# Patient Record
Sex: MALE | Race: WHITE
[De-identification: names, ages, dates, MRNs, and addresses within clinical notes are randomized per-mention and may not be internally consistent; named-entity substitution may affect disease eponyms.]

---

## 2019-04-08 ENCOUNTER — HOSPITAL ENCOUNTER (INPATIENT)
Dept: HOSPITAL 35 - ER | Age: 64
LOS: 2 days | Discharge: HOME | DRG: 246 | End: 2019-04-10
Attending: HOSPITALIST | Admitting: HOSPITALIST
Payer: COMMERCIAL

## 2019-04-08 VITALS — SYSTOLIC BLOOD PRESSURE: 109 MMHG | DIASTOLIC BLOOD PRESSURE: 64 MMHG

## 2019-04-08 VITALS — HEIGHT: 72 IN | BODY MASS INDEX: 29.39 KG/M2 | WEIGHT: 217 LBS

## 2019-04-08 VITALS — SYSTOLIC BLOOD PRESSURE: 124 MMHG | DIASTOLIC BLOOD PRESSURE: 70 MMHG

## 2019-04-08 VITALS — SYSTOLIC BLOOD PRESSURE: 123 MMHG | DIASTOLIC BLOOD PRESSURE: 77 MMHG

## 2019-04-08 VITALS — DIASTOLIC BLOOD PRESSURE: 69 MMHG | SYSTOLIC BLOOD PRESSURE: 135 MMHG

## 2019-04-08 VITALS — DIASTOLIC BLOOD PRESSURE: 66 MMHG | SYSTOLIC BLOOD PRESSURE: 131 MMHG

## 2019-04-08 VITALS — DIASTOLIC BLOOD PRESSURE: 67 MMHG | SYSTOLIC BLOOD PRESSURE: 113 MMHG

## 2019-04-08 VITALS — DIASTOLIC BLOOD PRESSURE: 64 MMHG | SYSTOLIC BLOOD PRESSURE: 109 MMHG

## 2019-04-08 VITALS — DIASTOLIC BLOOD PRESSURE: 84 MMHG | SYSTOLIC BLOOD PRESSURE: 168 MMHG

## 2019-04-08 DIAGNOSIS — Z82.49: ICD-10-CM

## 2019-04-08 DIAGNOSIS — Z79.899: ICD-10-CM

## 2019-04-08 DIAGNOSIS — Z87.891: ICD-10-CM

## 2019-04-08 DIAGNOSIS — F32.9: ICD-10-CM

## 2019-04-08 DIAGNOSIS — I21.4: Primary | ICD-10-CM

## 2019-04-08 DIAGNOSIS — E78.5: ICD-10-CM

## 2019-04-08 DIAGNOSIS — I50.33: ICD-10-CM

## 2019-04-08 DIAGNOSIS — F41.9: ICD-10-CM

## 2019-04-08 LAB
ALBUMIN SERPL-MCNC: 3.4 G/DL (ref 3.4–5)
ALT SERPL-CCNC: 31 U/L (ref 30–65)
ANION GAP SERPL CALC-SCNC: 14 MMOL/L (ref 7–16)
AST SERPL-CCNC: 19 U/L (ref 15–37)
BASOPHILS NFR BLD AUTO: 1.4 % (ref 0–2)
BILIRUB SERPL-MCNC: 0.4 MG/DL
BUN SERPL-MCNC: 34 MG/DL (ref 7–18)
CALCIUM SERPL-MCNC: 8.8 MG/DL (ref 8.5–10.1)
CHLORIDE SERPL-SCNC: 103 MMOL/L (ref 98–107)
CHOLEST SERPL-MCNC: 200 MG/DL (ref ?–200)
CO2 SERPL-SCNC: 22 MMOL/L (ref 21–32)
CREAT SERPL-MCNC: 1.4 MG/DL (ref 0.7–1.3)
EOSINOPHIL NFR BLD: 1.9 % (ref 0–3)
ERYTHROCYTE [DISTWIDTH] IN BLOOD BY AUTOMATED COUNT: 12.9 % (ref 10.5–14.5)
EST. AVERAGE GLUCOSE BLD GHB EST-MCNC: 111 MG/DL
GLUCOSE SERPL-MCNC: 125 MG/DL (ref 74–106)
GLYCOHEMOGLOBIN (HGB A1C): 5.5 % (ref 4.8–5.6)
GRANULOCYTES NFR BLD MANUAL: 53.7 % (ref 36–66)
HCT VFR BLD CALC: 40.6 % (ref 42–52)
HDLC SERPL-MCNC: 46 MG/DL (ref 40–?)
HGB BLD-MCNC: 14.2 GM/DL (ref 14–18)
LDLC SERPL-MCNC: 134 MG/DL (ref ?–100)
LYMPHOCYTES NFR BLD AUTO: 36.4 % (ref 24–44)
MCH RBC QN AUTO: 31 PG (ref 26–34)
MCHC RBC AUTO-ENTMCNC: 34.8 G/DL (ref 28–37)
MCV RBC: 89.1 FL (ref 80–100)
MONOCYTES NFR BLD: 6.6 % (ref 1–8)
NEUTROPHILS # BLD: 4.6 THOU/UL (ref 1.4–8.2)
PLATELET # BLD: 237 THOU/UL (ref 150–400)
POTASSIUM SERPL-SCNC: 3.8 MMOL/L (ref 3.5–5.1)
PROT SERPL-MCNC: 6.8 G/DL (ref 6.4–8.2)
RBC # BLD AUTO: 4.56 MIL/UL (ref 4.5–6)
SODIUM SERPL-SCNC: 139 MMOL/L (ref 136–145)
TC:HDL: 4.3 RATIO
TRIGL SERPL-MCNC: 102 MG/DL (ref ?–150)
TROPONIN I SERPL-MCNC: 1.74 NG/ML (ref ?–0.06)
TROPONIN I SERPL-MCNC: <0.06 NG/ML (ref ?–0.06)
VLDLC SERPL CALC-MCNC: 20 MG/DL (ref ?–40)
WBC # BLD AUTO: 8.6 THOU/UL (ref 4–11)

## 2019-04-08 PROCEDURE — 10081 I&D PILONIDAL CYST COMP: CPT

## 2019-04-08 NOTE — NUR
PT ADMITED FROM ED AROUND 0300. VSS. ASSESSMENT AS CHARTED. PT C/O 1/10 CHEST
PRESSURE. DENIES SOA, N/V, OR DIZZINESS. PT WAS GIVEN NITRO AND MORPHINE IN ER
PER [facility].  AWAITING NEW ORDERS. ADMISSION AND MED RECONCIL COMPLETE, PT WIFE
IN [place] HAS DPOA AND AD PAPERS.  WILL CONTINUE TO MONITOR AND WITH POC.

## 2019-04-08 NOTE — NUR
RECEIVED FROM CATH LAB. VSS SB ON MONITER, R GROIN MYNX SITE WITHOUT HEMATOMA
OR BRUIT. IMSTRUCTED BR X 4 HOURS. SEE DATA FLOW SHEET FOR FREQ VS AND GROIN
CHECKS. WILL CONTINUE TO MONITER AND CARE FOR PT PER VICKIE N OF CARE

## 2019-04-09 VITALS — SYSTOLIC BLOOD PRESSURE: 122 MMHG | DIASTOLIC BLOOD PRESSURE: 77 MMHG

## 2019-04-09 VITALS — DIASTOLIC BLOOD PRESSURE: 85 MMHG | SYSTOLIC BLOOD PRESSURE: 125 MMHG

## 2019-04-09 VITALS — SYSTOLIC BLOOD PRESSURE: 111 MMHG | DIASTOLIC BLOOD PRESSURE: 55 MMHG

## 2019-04-09 VITALS — DIASTOLIC BLOOD PRESSURE: 71 MMHG | SYSTOLIC BLOOD PRESSURE: 125 MMHG

## 2019-04-09 VITALS — DIASTOLIC BLOOD PRESSURE: 65 MMHG | SYSTOLIC BLOOD PRESSURE: 109 MMHG

## 2019-04-09 VITALS — DIASTOLIC BLOOD PRESSURE: 63 MMHG | SYSTOLIC BLOOD PRESSURE: 116 MMHG

## 2019-04-09 LAB
ALBUMIN SERPL-MCNC: 3 G/DL (ref 3.4–5)
ALT SERPL-CCNC: 41 U/L (ref 30–65)
ANION GAP SERPL CALC-SCNC: 10 MMOL/L (ref 7–16)
AST SERPL-CCNC: 138 U/L (ref 15–37)
BILIRUB SERPL-MCNC: 0.9 MG/DL
BUN SERPL-MCNC: 22 MG/DL (ref 7–18)
CALCIUM SERPL-MCNC: 8.2 MG/DL (ref 8.5–10.1)
CHLORIDE SERPL-SCNC: 103 MMOL/L (ref 98–107)
CO2 SERPL-SCNC: 24 MMOL/L (ref 21–32)
CREAT SERPL-MCNC: 1.2 MG/DL (ref 0.7–1.3)
ERYTHROCYTE [DISTWIDTH] IN BLOOD BY AUTOMATED COUNT: 12.6 % (ref 10.5–14.5)
GLUCOSE SERPL-MCNC: 102 MG/DL (ref 74–106)
HCT VFR BLD CALC: 38.5 % (ref 42–52)
HGB BLD-MCNC: 13.2 GM/DL (ref 14–18)
MCH RBC QN AUTO: 30.8 PG (ref 26–34)
MCHC RBC AUTO-ENTMCNC: 34.2 G/DL (ref 28–37)
MCV RBC: 90 FL (ref 80–100)
PLATELET # BLD: 200 THOU/UL (ref 150–400)
POTASSIUM SERPL-SCNC: 3.9 MMOL/L (ref 3.5–5.1)
PROT SERPL-MCNC: 6 G/DL (ref 6.4–8.2)
RBC # BLD AUTO: 4.28 MIL/UL (ref 4.5–6)
SODIUM SERPL-SCNC: 137 MMOL/L (ref 136–145)
TROPONIN I SERPL-MCNC: 27.1 NG/ML (ref ?–0.06)
WBC # BLD AUTO: 9.3 THOU/UL (ref 4–11)

## 2019-04-09 NOTE — NUR
VSS NSR LUNGS CLEAR RA SAT , UP IN ROOM AND MUNOZ TODAY WITHOUT C/O CHEST
PAIN . WILL CONTINUE TO MONITER AND CARE FOR PT PER PLAN OF CARE

## 2019-04-09 NOTE — NUR
ASSUMED CARE 1900. VSS. ASSESSMENT AS CHARTED. PT DENIES ANY CP OR CONCERNS. R
GROIN SITE CDI NO HEMATOMA OR BRUIT. PLAN FOR LABS AND EKG THIS AM. WILL
CONTINUE TO MONITOR AND WITH POC

## 2019-04-10 VITALS — SYSTOLIC BLOOD PRESSURE: 111 MMHG | DIASTOLIC BLOOD PRESSURE: 74 MMHG

## 2019-04-10 VITALS — SYSTOLIC BLOOD PRESSURE: 122 MMHG | DIASTOLIC BLOOD PRESSURE: 69 MMHG

## 2019-04-10 NOTE — NUR
VSS. ASSESSMENT AS CHARTED. PT DENIES CP, SOA, OR CONCERN. R GROIN SITE CDI,
NO HEMATOMA OR BRUIT. PT REVIEW CARDIOLOGY BOOKLET, SLEPT WELL THROUGHOUT
NIGHT. PLAN FOR HOPEFUL D/C TODAY. WILL CONTINUE TO MONITOR AND WITH POC.

## 2019-04-15 ENCOUNTER — HOSPITAL ENCOUNTER (EMERGENCY)
Dept: HOSPITAL 35 - ER | Age: 64
Discharge: HOME | End: 2019-04-15
Payer: COMMERCIAL

## 2019-04-15 VITALS — HEIGHT: 72 IN | WEIGHT: 210.01 LBS | BODY MASS INDEX: 28.45 KG/M2

## 2019-04-15 VITALS — DIASTOLIC BLOOD PRESSURE: 73 MMHG | SYSTOLIC BLOOD PRESSURE: 133 MMHG

## 2019-04-15 DIAGNOSIS — R04.0: Primary | ICD-10-CM

## 2019-04-15 DIAGNOSIS — I25.2: ICD-10-CM

## 2019-04-15 DIAGNOSIS — Z87.891: ICD-10-CM
